# Patient Record
Sex: MALE | Race: WHITE | NOT HISPANIC OR LATINO
[De-identification: names, ages, dates, MRNs, and addresses within clinical notes are randomized per-mention and may not be internally consistent; named-entity substitution may affect disease eponyms.]

---

## 2022-06-09 ENCOUNTER — APPOINTMENT (OUTPATIENT)
Dept: ORTHOPEDIC SURGERY | Facility: CLINIC | Age: 52
End: 2022-06-09
Payer: COMMERCIAL

## 2022-06-09 VITALS — WEIGHT: 270 LBS | HEIGHT: 67 IN | BODY MASS INDEX: 42.38 KG/M2

## 2022-06-09 DIAGNOSIS — M77.01 MEDIAL EPICONDYLITIS, RIGHT ELBOW: ICD-10-CM

## 2022-06-09 DIAGNOSIS — Z78.9 OTHER SPECIFIED HEALTH STATUS: ICD-10-CM

## 2022-06-09 DIAGNOSIS — M25.521 PAIN IN RIGHT ELBOW: ICD-10-CM

## 2022-06-09 DIAGNOSIS — M77.11 LATERAL EPICONDYLITIS, RIGHT ELBOW: ICD-10-CM

## 2022-06-09 PROBLEM — Z00.00 ENCOUNTER FOR PREVENTIVE HEALTH EXAMINATION: Status: ACTIVE | Noted: 2022-06-09

## 2022-06-09 PROCEDURE — 99204 OFFICE O/P NEW MOD 45 MIN: CPT

## 2022-06-09 PROCEDURE — 73080 X-RAY EXAM OF ELBOW: CPT | Mod: RT

## 2022-06-09 RX ORDER — DICLOFENAC SODIUM 20 MG/G
2 SOLUTION TOPICAL
Qty: 1 | Refills: 2 | Status: ACTIVE | COMMUNITY
Start: 2022-06-09 | End: 1900-01-01

## 2022-06-09 RX ORDER — NAPROXEN 500 MG/1
500 TABLET ORAL
Qty: 60 | Refills: 0 | Status: ACTIVE | COMMUNITY
Start: 2022-06-09 | End: 1900-01-01

## 2022-06-12 PROBLEM — M77.01 MEDIAL EPICONDYLITIS OF RIGHT ELBOW: Status: ACTIVE | Noted: 2022-06-09

## 2022-06-12 PROBLEM — M77.11 LATERAL EPICONDYLITIS OF RIGHT ELBOW: Status: ACTIVE | Noted: 2022-06-09

## 2022-06-12 NOTE — IMAGING
[Right] : right elbow [There are no fractures, subluxations or dislocations. No significant abnormalities are seen] : There are no fractures, subluxations or dislocations. No significant abnormalities are seen [de-identified] : The patient is a well appearing 51 year old M of their stated age.\par Neck is supple & nontender to palpation. Negative Spurling's test.\par \par Right Shoulder:       	\par ROM:\par Forward Flexion: 180 degrees\par Abduction: 180 degrees\par ER at 90: 90 degrees\par IR at 90: 45 degrees\par ER at N: 50 degrees\par Motor:\par Abduction: 5 out of 5\par FPS: 5 out of 5\par Flexion: 5 out of 5\par Internal Rotation: 5 out of 5\par External Rotation: 5 out of 5\par Provocative Testing:\par Impingement: Negative\par [place]'s: Negative\par Other: N/A\par \par Left Shoulder:\par ROM:\par Forward Flexion: 180 degrees\par Abduction: 180 degrees\par ER at 90: 90 degrees\par IR at 90: 45 degrees\par ER at N: 50 degrees\par Motor:\par Abduction: 5 out of 5\par FPS: 5 out of 5\par Flexion: 5 out of 5\par Internal Rotation: 5 out of 5\par External Rotation: 5 out of 5\par Provocative Testing:\par Impingement: Negative\par [place]'s: Negative\par Other: N/A\par _____________________\par Effected Elbow: right\par ROM:\par Flexion: 0-145 degrees\par Supination: 90 degrees\par Pronation: 90 degrees\par \par Inspection:\par Erythema: None\par Ecchymosis: None\par Abrasions: None\par Effusion: None\par Deformity: None\par \par Palpation:\par Crepitus: None\par Medial Epicondyle/Flexor-Pronator: tender\par Lateral Epicondyle/ECRB: tender\par Olecranon: Nontender\par Radial Head: Nontender\par Distal Biceps: Nontender\par Distal Triceps:  Nontender\par Flexor-Pronator: Nontender\par Extensor/ECRB: Nontender\par UCL: Nontender\par Pronator Intersection: Nontender\par Ulnar Nerve:  Stable & Nontender\par \par Stress Testing:\par Varus at 0 Degrees: Stable\par Varus at 30 Degrees: Stable\par Valgus at 0 Degrees: Stable\par Valgus at 30 Degrees: Stable\par \par Motor:\par Elbow Flexion: 5 out of 5\par Elbow Extension: 5 out of 5\par Supination: 5 out of 5\par Pronation: 5 out of 5\par Wrist Flexion: 5 out of 5\par Wrist Extension: 5 out of 5\par Interossei: 5 out of 5\par : 5 out of 5\par \par Provocative Testing:\par Milking: Negative\par Moving Valgus Stress: Negative\par Posterolateral R-I: Negative\par Hook Test: Negative\par Resisted Wrist Extension: Pain\par Resisted Index Finger Extension: Pain\par Resisted Middle Finger Extension: Pain\par Resisted Wrist Flexion: Pain\par Resisted Pronation: Pain\par \par Neurologic Exam:\par Axillary Nerve:  SLT\par Radial Nerve: SLT\par Median Nerve: SLT\par Ulnar Nerve:  SLT\par Other:  N/A\par Vascular Exam:\par Radial Pulse: 2+\par Ulnar Pulse: 2+\par Capillary Refill: <2 Seconds\par Other Exams: None\par Pertinent Contralateral Elbow Findings: None\par \par Assessment: The patient is a 51 year old M with right elbow pain and radiographic and physical exam findings consistent with medial and lateral epicondylitis\par \par The patient’s condition is acute\par Documents/Results Reviewed Today: Xray right elbow\par Tests/Studies Independently Interpreted Today: Xray right elbow is normal\par Pertinent findings include: tender medial and lateral epicondyle, pain with resisted wrist, middle and index finger extension, pain with resisted wrist flexion and pronation\par Confounding medical conditions/concerns: none\par \par Plan: Patient will use a Cock-up wrist splint and reparel elbow sleeve. Given script for PT.\par Tests Ordered: none\par Prescription Medications Ordered: Pennsaid, Naprosyn\par Braces/DME Ordered: Reparel elbow sleeve, Cock-up wrist splint\par Activity/Work/Sports Status: retired\par Additional Instructions: none\par Follow-Up: 6wks\par \par The patient's current medication management of their orthopedic diagnosis was reviewed today:\par (1) We discussed a comprehensive treatment plan that included possible pharmaceutical management involving the use of prescription strength medications including but not limited to options such as oral Naprosyn 500mg BID, once daily Meloxicam 15 mg, or 500-650 mg Tylenol versus over the counter oral medications and topical prescription NSAID Pennsaid vs over the counter Voltaren gel.\par (2) There is a moderate risk of morbidity with further treatment, especially from use of prescription strength medications and possible side effects of these medications which include upset stomach with oral medications, skin reactions to topical medications and cardiac/renal issues with long term use.\par (3) I recommended that the patient follow-up with their medical physician to discuss any significant specific potential issues with long term medication use such as interactions with current medications or with exacerbation of underlying medical comorbidities.\par (4) The benefits and risks associated with use of injectable, oral or topical, prescription and over the counter anti-inflammatory medications were discussed with the patient. The patient voiced understanding of the risks including but not limited to bleeding, stroke, kidney dysfunction, heart disease, and were referred to the black box warning label for further information.\par \par I, Rahat Small, attest that this documentation has been prepared under the direction and in the presence of Provider Dr. Phillips\par \par The documentation recorded by the scribe accurately reflects the service I personally performed and the decisions made by me.\par \par \par

## 2022-06-12 NOTE — HISTORY OF PRESENT ILLNESS
[de-identified] : The patient is a 51 year old R hand dominant male who presents today complaining of R medial and lateral elbow pain.  \par Date of Injury/Onset: 04/2022\par Pain:    At Rest: 4/10 \par With Activity:  8/10 \par Mechanism of injury: Digging and felt acute sharp pain which radiated distal and proximal at time of injury. \par This is not a Work Related Injury being treated under Worker's Compensation.\par This is not an athletic injury occurring associated with an interscholastic or organized sports team.\par Quality of symptoms: sharp pain, intermittent sharp pain radiates on medial and lateral aspects of arm from elbow to both shoulder and hand\par Improves with: compression, hot/cold compress\par Worse with: hang, lifting objects\par Prior treatment: Ice/Heat\par Prior Imaging: No\par Out of work/sport: N/A, since N/A\par School/Sport/Position/Occupation: Retired\par Additional Information: None

## 2022-07-21 ENCOUNTER — APPOINTMENT (OUTPATIENT)
Dept: ORTHOPEDIC SURGERY | Facility: CLINIC | Age: 52
End: 2022-07-21

## 2024-07-26 ENCOUNTER — APPOINTMENT (OUTPATIENT)
Dept: ORTHOPEDIC SURGERY | Facility: CLINIC | Age: 54
End: 2024-07-26
Payer: COMMERCIAL

## 2024-07-26 ENCOUNTER — RESULT CHARGE (OUTPATIENT)
Age: 54
End: 2024-07-26

## 2024-07-26 VITALS — WEIGHT: 270 LBS | HEIGHT: 67 IN | BODY MASS INDEX: 42.38 KG/M2

## 2024-07-26 DIAGNOSIS — M25.522 PAIN IN LEFT ELBOW: ICD-10-CM

## 2024-07-26 PROCEDURE — 73080 X-RAY EXAM OF ELBOW: CPT | Mod: LT

## 2024-07-26 PROCEDURE — 99214 OFFICE O/P EST MOD 30 MIN: CPT

## 2024-07-26 NOTE — HISTORY OF PRESENT ILLNESS
[de-identified] : The patient is a 53 year old right hand dominant male who presents today for left elbow pain. Date of Injury/Onset: 5/21/24 Pain: At Rest: 2/10 With Activity: 8/10 Mechanism of injury: fell and banged arm/elbow on ground This is NOT a Work Related Injury being treated under Worker's Compensation. This is NOT an athletic injury occurring associated with an interscholastic or organized sports team. Quality of symptoms: pain in the posterior and lateral aspects of the elbow, weakness  Improves with: nothing  Worse with: trying to lift anything with the arm Prior treatment: previous hx of lateral epicondylitis R elbow  Prior Imaging: None  Out of work/sport: No, since [X]  School/Sport/Position/Occupation: Retired Additional Information: Hx of left elbow distal biceps repair by Dr. Phillips in 2014

## 2024-07-26 NOTE — IMAGING
[de-identified] : The patient is a well appearing 53 year old male of their stated age.   Neck is supple & nontender to palpation. Negative Spurling's test.    Left Shoulder:    ROM:   Forward Flexion: 180 degrees   Abduction: 180 degrees   ER at 90: 90 degrees   IR at 90: 45 degrees   ER at N: 50 degrees    Motor:   Abduction: 5 out of 5   FPS: 5 out of 5   Flexion: 5 out of 5   Internal Rotation: 5 out of 5   External Rotation: 5 out of 5   Provocative Testing:   Impingement: Negative   [place]'s: Negative   Other: N/A    LEFT Effected Elbow:    ROM:   Flexion: 0-145 degrees   Supination: 90 degrees   Pronation: 90 degrees    Inspection:  Erythema: None  Ecchymosis: None  Abrasions: None  Effusion: Mild Deformity: None   Palpation:  Crepitus: None Medial Epicondyle/Flexor-Pronator: Nontender Lateral Epicondyle/ECRB: TENDER Olecranon: TENDER Radial Head: Nontender  Humeral Head: Nontender   Distal Biceps: Nontender  Distal Triceps: TENDER Flexor-Pronator: Nontender  Extensor/ECRB: TENDER UCL: Nontender  Pronator Intersection: Nontender  Ulnar Nerve:  Stable & Nontender   Stress Testing:  Varus at 0 Degrees: Stable  Varus at 30 Degrees: Stable  Valgus at 0 Degrees: Stable  Valgus at 30 Degrees: Stable   Motor:  Elbow Flexion: 5- out of 5  Elbow Extension: 3+ out of 5  Supination: 5- out of 5  Pronation: 5 out of 5  Wrist Flexion: 5 out of 5  Wrist Extension: 5 out of 5  Interossei: 5 out of 5  : 5 out of 5   Provocative Testing:  Milking: Negative  Moving Valgus Stress: Negative  Posterolateral R-I: Negative  Hook Test: Negative  Resisted Wrist Extension: + Resisted Index Finger Extension: +  Resisted Middle Finger Extension: +  Resisted Wrist Flexion: No Pain  Resisted Pronation: No Pain   Neurologic Exam:  Axillary Nerve:  SLT  Radial Nerve: SLT  Median Nerve: SLT  Ulnar Nerve:  SLT  Other:  N/A   Vascular Exam:   Radial Pulse: 2+  Ulnar Pulse: 2+  Capillary Refill: <2 Seconds  Other Exams: None   Pertinent Contralateral Elbow Findings: None     Assessment: The patient is a 53 year old male with left elbow pain and radiographic and physical exam findings consistent with acute lateral epicondylitis and possible distal triceps tear.      The patients condition is acute  Documents/Results Reviewed Today: XR left elbow Tests/Studies Independently Interpreted Today:  XR left elbow reveals evidence of prior distal biceps repair with button in place and bone overgrowth at radial tuberosity.  Pertinent findings include: 3+/5 triceps (elbow extension) strength, +TTP distal triceps and olecranon, +TTP lateral epicondyle, discomfort with extension of wrist/middle finger/index finger Confounding medical conditions/concerns: Hx left elbow distal biceps repair 2014  Plan:  Due to worsening pain and instability with mechanical symptoms, patient will obtain MRI left elbow to evaluate for possible distal triceps tear. In the interim, we reviewed appropriate use of OTC anti-inflammatories as needed for pain, inflammation, and discomfort. Modify activity as discussed.  Tests Ordered:  MRI left elbow  Prescription Medications Ordered: OTC NSAIDs prn  Braces/DME Ordered: None   Activity/Work/Sports Status: As tolerated Additional Instructions: None  Follow-Up: s/p MRI  The patient's current medication management of their orthopedic diagnosis was reviewed today:  (1) We discussed a comprehensive treatment plan that included possible pharmaceutical management involving the use of prescription strength medications including but not limited to options such as oral Naprosyn 500mg BID, once daily Meloxicam 15 mg, or 500-650 mg Tylenol versus over the counter oral medications and topical prescription NSAID Pennsaid vs over the counter Voltaren gel. Based on our extensive discussion, the patient declined prescription medication and will use over the counter Advil, Alleve, Voltaren Gel or Tylenol as directed.  (2) There is a moderate risk of morbidity with further treatment, especially from use of prescription strength medications and possible side effects of these medications which include upset stomach with oral medications, skin reactions to topical medications and cardiac/renal issues with long term use.  (3) I recommended that the patient follow-up with their medical physician to discuss any significant specific potential issues with long term medication use such as interactions with current medications or with exacerbation of underlying medical comorbidities.  (4) The benefits and risks associated with use of injectable, oral or topical, prescription and over the counter anti-inflammatory medications were discussed with the patient. The patient voiced understanding of the risks including but not limited to bleeding, stroke, kidney dysfunction, heart disease, and were referred to the black box warning label for further information.  The documentation accurately reflects the service ISilvia PA-C, personally performed and the decisions made by me. [Left] : left elbow [FreeTextEntry1] : evidence of prior distal biceps repair with button in place and bone overgrowth at radial tuberosity.

## 2024-07-30 ENCOUNTER — RESULT REVIEW (OUTPATIENT)
Age: 54
End: 2024-07-30

## 2024-08-01 ENCOUNTER — APPOINTMENT (OUTPATIENT)
Dept: ORTHOPEDIC SURGERY | Facility: CLINIC | Age: 54
End: 2024-08-01

## 2024-08-01 VITALS — BODY MASS INDEX: 42.38 KG/M2 | WEIGHT: 270 LBS | HEIGHT: 67 IN

## 2024-08-01 DIAGNOSIS — M77.12 LATERAL EPICONDYLITIS, LEFT ELBOW: ICD-10-CM

## 2024-08-01 PROCEDURE — 99214 OFFICE O/P EST MOD 30 MIN: CPT

## 2024-08-01 RX ORDER — NAPROXEN 500 MG/1
500 TABLET ORAL
Qty: 60 | Refills: 0 | Status: ACTIVE | COMMUNITY
Start: 2024-08-01 | End: 1900-01-01

## 2024-08-01 NOTE — HISTORY OF PRESENT ILLNESS
[de-identified] : The patient is a 53 year old right hand dominant male who presents today for left elbow pain. Date of Injury/Onset: 5/21/24 Pain: At Rest: 2/10 With Activity: 8/10 Mechanism of injury: fell and banged arm/elbow on ground This is NOT a Work Related Injury being treated under Worker's Compensation. This is NOT an athletic injury occurring associated with an interscholastic or organized sports team. Quality of symptoms: pain in the posterior and lateral aspects of the elbow, weakness  Improves with: nothing  Worse with: trying to lift anything with the arm Treatment/Imaging/Studies Since Last Visit: MRI at  	Reports Available For Review Today: none Out of work/sport: currently not working School/Sport/Position/Occupation: Retired Changes since last visit: patient reports no change in pain/symptoms since last visit. Here to review MRI results Additional Information: Hx of left elbow distal biceps repair by Dr. Phillips in 2014

## 2024-08-01 NOTE — HISTORY OF PRESENT ILLNESS
[de-identified] : The patient is a 53 year old right hand dominant male who presents today for left elbow pain. Date of Injury/Onset: 5/21/24 Pain: At Rest: 2/10 With Activity: 8/10 Mechanism of injury: fell and banged arm/elbow on ground This is NOT a Work Related Injury being treated under Worker's Compensation. This is NOT an athletic injury occurring associated with an interscholastic or organized sports team. Quality of symptoms: pain in the posterior and lateral aspects of the elbow, weakness  Improves with: nothing  Worse with: trying to lift anything with the arm Treatment/Imaging/Studies Since Last Visit: MRI at  	Reports Available For Review Today: none Out of work/sport: currently not working School/Sport/Position/Occupation: Retired Changes since last visit: patient reports no change in pain/symptoms since last visit. Here to review MRI results Additional Information: Hx of left elbow distal biceps repair by Dr. Phillips in 2014

## 2024-08-01 NOTE — IMAGING
[de-identified] : The patient is a well appearing 53 year old male of their stated age.   Neck is supple & nontender to palpation. Negative Spurling's test.    Left Shoulder:    ROM:   Forward Flexion: 180 degrees   Abduction: 180 degrees   ER at 90: 90 degrees   IR at 90: 45 degrees   ER at N: 50 degrees    Motor:   Abduction: 5 out of 5   FPS: 5 out of 5   Flexion: 5 out of 5   Internal Rotation: 5 out of 5   External Rotation: 5 out of 5   Provocative Testing:   Impingement: Negative   [place]'s: Negative   Other: N/A    LEFT Effected Elbow:    ROM:   Flexion: 0-145 degrees   Supination: 90 degrees   Pronation: 90 degrees    Inspection:  Erythema: None  Ecchymosis: None  Abrasions: None  Effusion: Mild Deformity: None   Palpation:  Crepitus: None Medial Epicondyle/Flexor-Pronator: Nontender Lateral Epicondyle/ECRB: TENDER Olecranon: TENDER Radial Head: Nontender  Humeral Head: Nontender   Distal Biceps: Nontender  Distal Triceps: TENDER Flexor-Pronator: Nontender  Extensor/ECRB: TENDER UCL: Nontender  Pronator Intersection: Nontender  Ulnar Nerve:  Stable & Nontender   Stress Testing:  Varus at 0 Degrees: Stable  Varus at 30 Degrees: Stable  Valgus at 0 Degrees: Stable  Valgus at 30 Degrees: Stable   Motor:  Elbow Flexion: 5- out of 5  Elbow Extension: 3+ out of 5  Supination: 5- out of 5  Pronation: 5 out of 5  Wrist Flexion: 5 out of 5  Wrist Extension: 5 out of 5  Interossei: 5 out of 5  : 5 out of 5   Provocative Testing:  Milking: Negative  Moving Valgus Stress: Negative  Posterolateral R-I: Negative  Hook Test: Negative  Resisted Wrist Extension: + Resisted Index Finger Extension: +  Resisted Middle Finger Extension: +  Resisted Wrist Flexion: No Pain  Resisted Pronation: No Pain   Neurologic Exam:  Axillary Nerve:  SLT  Radial Nerve: SLT  Median Nerve: SLT  Ulnar Nerve:  SLT  Other:  N/A   Vascular Exam:   Radial Pulse: 2+  Ulnar Pulse: 2+  Capillary Refill: <2 Seconds  Other Exams: None   Pertinent Contralateral Elbow Findings: None     Assessment: The patient is a 53 year old male with left elbow pain and radiographic and physical exam findings consistent with acute lateral epicondylitis   The patient's condition is acute  Documents/Results Reviewed Today: MRI left elbow Tests/Studies Independently Interpreted Today:  MRI left elbow reveals evidence of previous distal biceps repair,   Pertinent findings include: 3+/5 triceps (elbow extension) strength, +TTP distal triceps and olecranon, +TTP lateral epicondyle, discomfort with extension of wrist/middle finger/index finger Confounding medical conditions/concerns: Hx left elbow distal biceps repair 2014  Plan: The patient will begin physical therapy, HEP, and stretching for lateral epicondylitis. Recommended the patient obtain a Reparel elbow sleeve and topical Voltaren gel to aid in inflammation. The patient will begin use of counter force strap during activity and cock-up wrist splint at night to alleviate symptoms related to lateral epicondylitis - Rx provided today. Prescribed the patient Naproxen 500 mg BID for pain management and inflammation - use as directed and take with food. Reviewed all proper activity modifications to avoid aggravation of lateral epicondylitis to include changing . If any pain persists despite conservative management, we will discuss proceeding with a corticosteroid injection. Modify activity as discussed.  Tests Ordered: None  Prescription Medications Ordered: Naproxen 500mg  Braces/DME Ordered: Reparel, counter force strap, cock-up wrist splint  Activity/Work/Sports Status: As tolerated  Additional Instructions: Topical Voltaren gel, change   Follow-Up: 3 weeks  The patient's current medication management of their orthopedic diagnosis was reviewed today: (1) We discussed a comprehensive treatment plan that included possible pharmaceutical management involving the use of prescription strength medications including but not limited to options such as oral Naprosyn 500mg BID, once daily Meloxicam 15 mg, or 500-650 mg Tylenol versus over the counter oral medications and topical prescription NSAID Pennsaid vs over the counter Voltaren gel.  Based on our extensive discussion, the patient was prescribed Naprosyn 500mg BID for two weeks.  It will then be used PRN for pain, inflammation and discomfort. (2) There is a moderate risk of morbidity with further treatment, especially from use of prescription strength medications and possible side effects of these medications which include upset stomach with oral medications, skin reactions to topical medications and cardiac/renal issues with long term use. (3) I recommended that the patient follow-up with their medical physician to discuss any significant specific potential issues with long term medication use such as interactions with current medications or with exacerbation of underlying medical comorbidities. (4) The benefits and risks associated with use of injectable, oral or topical, prescription and over the counter anti-inflammatory medications were discussed with the patient. The patient voiced understanding of the risks including but not limited to bleeding, stroke, kidney dysfunction, heart disease, and were referred to the black box warning label for further information.  Yoli BARRETT attest that this documentation has been prepared under the direction and in the presence of Provider Dr. Kelvin Phillips.  The documentation recorded by the scribe accurately reflects the services Dr. Kelvin BARRETT, personally performed and the decisions made by me.

## 2024-08-01 NOTE — IMAGING
[de-identified] : The patient is a well appearing 53 year old male of their stated age.   Neck is supple & nontender to palpation. Negative Spurling's test.    Left Shoulder:    ROM:   Forward Flexion: 180 degrees   Abduction: 180 degrees   ER at 90: 90 degrees   IR at 90: 45 degrees   ER at N: 50 degrees    Motor:   Abduction: 5 out of 5   FPS: 5 out of 5   Flexion: 5 out of 5   Internal Rotation: 5 out of 5   External Rotation: 5 out of 5   Provocative Testing:   Impingement: Negative   [place]'s: Negative   Other: N/A    LEFT Effected Elbow:    ROM:   Flexion: 0-145 degrees   Supination: 90 degrees   Pronation: 90 degrees    Inspection:  Erythema: None  Ecchymosis: None  Abrasions: None  Effusion: Mild Deformity: None   Palpation:  Crepitus: None Medial Epicondyle/Flexor-Pronator: Nontender Lateral Epicondyle/ECRB: TENDER Olecranon: TENDER Radial Head: Nontender  Humeral Head: Nontender   Distal Biceps: Nontender  Distal Triceps: TENDER Flexor-Pronator: Nontender  Extensor/ECRB: TENDER UCL: Nontender  Pronator Intersection: Nontender  Ulnar Nerve:  Stable & Nontender   Stress Testing:  Varus at 0 Degrees: Stable  Varus at 30 Degrees: Stable  Valgus at 0 Degrees: Stable  Valgus at 30 Degrees: Stable   Motor:  Elbow Flexion: 5- out of 5  Elbow Extension: 3+ out of 5  Supination: 5- out of 5  Pronation: 5 out of 5  Wrist Flexion: 5 out of 5  Wrist Extension: 5 out of 5  Interossei: 5 out of 5  : 5 out of 5   Provocative Testing:  Milking: Negative  Moving Valgus Stress: Negative  Posterolateral R-I: Negative  Hook Test: Negative  Resisted Wrist Extension: + Resisted Index Finger Extension: +  Resisted Middle Finger Extension: +  Resisted Wrist Flexion: No Pain  Resisted Pronation: No Pain   Neurologic Exam:  Axillary Nerve:  SLT  Radial Nerve: SLT  Median Nerve: SLT  Ulnar Nerve:  SLT  Other:  N/A   Vascular Exam:   Radial Pulse: 2+  Ulnar Pulse: 2+  Capillary Refill: <2 Seconds  Other Exams: None   Pertinent Contralateral Elbow Findings: None     Assessment: The patient is a 53 year old male with left elbow pain and radiographic and physical exam findings consistent with acute lateral epicondylitis   The patient's condition is acute  Documents/Results Reviewed Today: MRI left elbow Tests/Studies Independently Interpreted Today:  MRI left elbow reveals evidence of previous distal biceps repair,   Pertinent findings include: 3+/5 triceps (elbow extension) strength, +TTP distal triceps and olecranon, +TTP lateral epicondyle, discomfort with extension of wrist/middle finger/index finger Confounding medical conditions/concerns: Hx left elbow distal biceps repair 2014  Plan: The patient will begin physical therapy, HEP, and stretching for lateral epicondylitis. Recommended the patient obtain a Reparel elbow sleeve and topical Voltaren gel to aid in inflammation. The patient will begin use of counter force strap during activity and cock-up wrist splint at night to alleviate symptoms related to lateral epicondylitis - Rx provided today. Prescribed the patient Naproxen 500 mg BID for pain management and inflammation - use as directed and take with food. Reviewed all proper activity modifications to avoid aggravation of lateral epicondylitis to include changing . If any pain persists despite conservative management, we will discuss proceeding with a corticosteroid injection. Modify activity as discussed.  Tests Ordered: None  Prescription Medications Ordered: Naproxen 500mg  Braces/DME Ordered: Reparel, counter force strap, cock-up wrist splint  Activity/Work/Sports Status: As tolerated  Additional Instructions: Topical Voltaren gel, change   Follow-Up: 3 weeks  The patient's current medication management of their orthopedic diagnosis was reviewed today: (1) We discussed a comprehensive treatment plan that included possible pharmaceutical management involving the use of prescription strength medications including but not limited to options such as oral Naprosyn 500mg BID, once daily Meloxicam 15 mg, or 500-650 mg Tylenol versus over the counter oral medications and topical prescription NSAID Pennsaid vs over the counter Voltaren gel.  Based on our extensive discussion, the patient was prescribed Naprosyn 500mg BID for two weeks.  It will then be used PRN for pain, inflammation and discomfort. (2) There is a moderate risk of morbidity with further treatment, especially from use of prescription strength medications and possible side effects of these medications which include upset stomach with oral medications, skin reactions to topical medications and cardiac/renal issues with long term use. (3) I recommended that the patient follow-up with their medical physician to discuss any significant specific potential issues with long term medication use such as interactions with current medications or with exacerbation of underlying medical comorbidities. (4) The benefits and risks associated with use of injectable, oral or topical, prescription and over the counter anti-inflammatory medications were discussed with the patient. The patient voiced understanding of the risks including but not limited to bleeding, stroke, kidney dysfunction, heart disease, and were referred to the black box warning label for further information.  Yoli BARRETT attest that this documentation has been prepared under the direction and in the presence of Provider Dr. Kelvin Phillips.  The documentation recorded by the scribe accurately reflects the services Dr. Kelvin BARRETT, personally performed and the decisions made by me.

## 2024-08-01 NOTE — DATA REVIEWED
[MRI] : MRI [Left] : left [Elbow] : elbow [Report was reviewed and noted in the chart] : The report was reviewed and noted in the chart [I independently reviewed and interpreted images and report] : I independently reviewed and interpreted images and report [I reviewed the films/CD and additionally noted] : I reviewed the films/CD and additionally noted [[email or web address]] : MRI left elbow reveals evidence of previous distal biceps repair,

## 2024-08-29 ENCOUNTER — APPOINTMENT (OUTPATIENT)
Dept: ORTHOPEDIC SURGERY | Facility: CLINIC | Age: 54
End: 2024-08-29